# Patient Record
Sex: FEMALE
[De-identification: names, ages, dates, MRNs, and addresses within clinical notes are randomized per-mention and may not be internally consistent; named-entity substitution may affect disease eponyms.]

---

## 2017-05-10 ENCOUNTER — HOSPITAL ENCOUNTER (EMERGENCY)
Dept: HOSPITAL 5 - ED | Age: 32
Discharge: LEFT BEFORE DISCHARGE | End: 2017-05-10
Payer: MEDICAID

## 2017-05-10 VITALS — DIASTOLIC BLOOD PRESSURE: 90 MMHG | SYSTOLIC BLOOD PRESSURE: 118 MMHG

## 2017-05-10 DIAGNOSIS — Z53.21: ICD-10-CM

## 2017-05-10 DIAGNOSIS — M54.9: ICD-10-CM

## 2017-05-10 DIAGNOSIS — R10.9: Primary | ICD-10-CM

## 2017-05-10 LAB
ALBUMIN SERPL-MCNC: 4.6 G/DL (ref 3.9–5)
ALBUMIN/GLOB SERPL: 1.7 %
ALP SERPL-CCNC: 84 UNITS/L (ref 35–129)
ALT SERPL-CCNC: 30 UNITS/L (ref 7–56)
ANION GAP SERPL CALC-SCNC: 18 MMOL/L
BILIRUB SERPL-MCNC: 0.3 MG/DL (ref 0.1–1.2)
BILIRUB UR QL STRIP: (no result)
BLASTOCYTES % (MANUAL): 0 %
BLOOD UR QL VISUAL: (no result)
BUN SERPL-MCNC: 8 MG/DL (ref 7–17)
BUN/CREAT SERPL: 13.33 %
CALCIUM SERPL-MCNC: 9.4 MG/DL (ref 8.4–10.2)
CHLORIDE SERPL-SCNC: 101.7 MMOL/L (ref 98–107)
CO2 SERPL-SCNC: 23 MMOL/L (ref 22–30)
GLUCOSE SERPL-MCNC: 90 MG/DL (ref 65–100)
HCT VFR BLD CALC: 46.1 % (ref 30.3–42.9)
HGB BLD-MCNC: 15.5 GM/DL (ref 10.1–14.3)
KETONES UR STRIP-MCNC: (no result) MG/DL
LEUKOCYTE ESTERASE UR QL STRIP: (no result)
LIPASE SERPL-CCNC: 27 UNITS/L (ref 13–60)
MCH RBC QN AUTO: 32 PG (ref 28–32)
MCHC RBC AUTO-ENTMCNC: 34 % (ref 30–34)
MCV RBC AUTO: 94 FL (ref 79–97)
MUCOUS THREADS #/AREA URNS HPF: (no result) /HPF
NITRITE UR QL STRIP: (no result)
PH UR STRIP: 5 [PH] (ref 5–7)
PLATELET # BLD: 272 K/MM3 (ref 140–440)
POTASSIUM SERPL-SCNC: 4.3 MMOL/L (ref 3.6–5)
PROT SERPL-MCNC: 7.3 G/DL (ref 6.3–8.2)
PROT UR STRIP-MCNC: (no result) MG/DL
RBC # BLD AUTO: 4.92 M/MM3 (ref 3.65–5.03)
RBC #/AREA URNS HPF: < 1 /HPF (ref 0–6)
SODIUM SERPL-SCNC: 138 MMOL/L (ref 137–145)
TOTAL CELLS COUNTED PERCENT: 9
UROBILINOGEN UR-MCNC: < 2 MG/DL (ref ?–2)
WBC # BLD AUTO: 13.8 K/MM3 (ref 4.5–11)
WBC #/AREA URNS HPF: 5 /HPF (ref 0–6)

## 2017-05-10 PROCEDURE — 36415 COLL VENOUS BLD VENIPUNCTURE: CPT

## 2017-05-10 PROCEDURE — 81001 URINALYSIS AUTO W/SCOPE: CPT

## 2017-05-10 PROCEDURE — 86850 RBC ANTIBODY SCREEN: CPT

## 2017-05-10 PROCEDURE — 85007 BL SMEAR W/DIFF WBC COUNT: CPT

## 2017-05-10 PROCEDURE — 85025 COMPLETE CBC W/AUTO DIFF WBC: CPT

## 2017-05-10 PROCEDURE — 86901 BLOOD TYPING SEROLOGIC RH(D): CPT

## 2017-05-10 PROCEDURE — 84702 CHORIONIC GONADOTROPIN TEST: CPT

## 2017-05-10 PROCEDURE — 83690 ASSAY OF LIPASE: CPT

## 2017-05-10 PROCEDURE — 80053 COMPREHEN METABOLIC PANEL: CPT

## 2017-05-10 PROCEDURE — 86900 BLOOD TYPING SEROLOGIC ABO: CPT

## 2017-09-02 ENCOUNTER — HOSPITAL ENCOUNTER (EMERGENCY)
Dept: HOSPITAL 5 - ED | Age: 32
LOS: 1 days | Discharge: HOME | End: 2017-09-03
Payer: MEDICAID

## 2017-09-02 DIAGNOSIS — F17.200: ICD-10-CM

## 2017-09-02 DIAGNOSIS — K52.9: Primary | ICD-10-CM

## 2017-09-02 DIAGNOSIS — R39.198: ICD-10-CM

## 2017-09-02 LAB
ALBUMIN SERPL-MCNC: 3.6 G/DL (ref 3.9–5)
ALBUMIN/GLOB SERPL: 1.2 %
ALP SERPL-CCNC: 73 UNITS/L (ref 35–129)
ALT SERPL-CCNC: 28 UNITS/L (ref 7–56)
ANION GAP SERPL CALC-SCNC: 17 MMOL/L
BACTERIA #/AREA URNS HPF: (no result) /HPF
BASOPHILS NFR BLD AUTO: 0.4 % (ref 0–1.8)
BILIRUB SERPL-MCNC: 0.2 MG/DL (ref 0.1–1.2)
BILIRUB UR QL STRIP: (no result)
BLOOD UR QL VISUAL: (no result)
BUN SERPL-MCNC: 9 MG/DL (ref 7–17)
BUN/CREAT SERPL: 15 %
CALCIUM SERPL-MCNC: 8.3 MG/DL (ref 8.4–10.2)
CHLORIDE SERPL-SCNC: 103.9 MMOL/L (ref 98–107)
CO2 SERPL-SCNC: 19 MMOL/L (ref 22–30)
EOSINOPHIL NFR BLD AUTO: 1.3 % (ref 0–4.3)
GLUCOSE SERPL-MCNC: 83 MG/DL (ref 65–100)
HCT VFR BLD CALC: 39.9 % (ref 30.3–42.9)
HGB BLD-MCNC: 13.5 GM/DL (ref 10.1–14.3)
KETONES UR STRIP-MCNC: (no result) MG/DL
LEUKOCYTE ESTERASE UR QL STRIP: (no result)
LIPASE SERPL-CCNC: 30 UNITS/L (ref 13–60)
MCH RBC QN AUTO: 32 PG (ref 28–32)
MCHC RBC AUTO-ENTMCNC: 34 % (ref 30–34)
MCV RBC AUTO: 95 FL (ref 79–97)
MUCOUS THREADS #/AREA URNS HPF: (no result) /HPF
NITRITE UR QL STRIP: (no result)
PH UR STRIP: 5 [PH] (ref 5–7)
PLATELET # BLD: 239 K/MM3 (ref 140–440)
POTASSIUM SERPL-SCNC: 4.2 MMOL/L (ref 3.6–5)
PROT SERPL-MCNC: 6.5 G/DL (ref 6.3–8.2)
PROT UR STRIP-MCNC: (no result) MG/DL
RBC # BLD AUTO: 4.21 M/MM3 (ref 3.65–5.03)
RBC #/AREA URNS HPF: < 1 /HPF (ref 0–6)
SODIUM SERPL-SCNC: 136 MMOL/L (ref 137–145)
URINE DRUGS OF ABUSE NOTE: (no result)
UROBILINOGEN UR-MCNC: < 2 MG/DL (ref ?–2)
WBC # BLD AUTO: 10.6 K/MM3 (ref 4.5–11)
WBC #/AREA URNS HPF: < 1 /HPF (ref 0–6)

## 2017-09-02 PROCEDURE — 85025 COMPLETE CBC W/AUTO DIFF WBC: CPT

## 2017-09-02 PROCEDURE — 96375 TX/PRO/DX INJ NEW DRUG ADDON: CPT

## 2017-09-02 PROCEDURE — 74176 CT ABD & PELVIS W/O CONTRAST: CPT

## 2017-09-02 PROCEDURE — 96374 THER/PROPH/DIAG INJ IV PUSH: CPT

## 2017-09-02 PROCEDURE — 83690 ASSAY OF LIPASE: CPT

## 2017-09-02 PROCEDURE — 84703 CHORIONIC GONADOTROPIN ASSAY: CPT

## 2017-09-02 PROCEDURE — 51701 INSERT BLADDER CATHETER: CPT

## 2017-09-02 PROCEDURE — 36415 COLL VENOUS BLD VENIPUNCTURE: CPT

## 2017-09-02 PROCEDURE — 81001 URINALYSIS AUTO W/SCOPE: CPT

## 2017-09-02 PROCEDURE — 99284 EMERGENCY DEPT VISIT MOD MDM: CPT

## 2017-09-02 PROCEDURE — 80053 COMPREHEN METABOLIC PANEL: CPT

## 2017-09-02 PROCEDURE — 80307 DRUG TEST PRSMV CHEM ANLYZR: CPT

## 2017-09-02 PROCEDURE — 96376 TX/PRO/DX INJ SAME DRUG ADON: CPT

## 2017-09-02 NOTE — CAT SCAN REPORT
FINAL REPORT



PROCEDURE:  CT ABDOMEN PELVIS WO CON



TECHNIQUE:  Computerized axial tomography of the abdomen and

pelvis was performed without intravenous contrast. This study is

performed without intravascular contrast material and its

sensitivity for abdominal and pelvic pathology, including

neoplasms, inflammation, abscess, free fluid, thrombosis,

arterial dissection and infarction, is reduced compared with a

contrast enhanced study. 



HISTORY:  left flank pain 



COMPARISON:  No prior studies are available for comparison.



FINDINGS:  

Visualized lower thorax: No significant abnormality.



Liver: Normal size and attenuation.



Spleen: Normal size and attenuation.



Gallbladder and biliary system: Normal.



Pancreas: Normal.



Adrenals: Normal.



Kidneys: Normal.



GI tract: Normal. Appendix is normal.



Lymph nodes and mesentery: Normal.



Vasculature: Normal.



Bladder: Normal.



Reproductive organs: Uterus is retroverted.



Peritoneum: No free fluid.



Musculoskeletal structures: Bilateral spondylolysis is noted.



Other: A small uncomplicated umbilical hernia is noted.



IMPRESSION:  

No acute intra-abdominal or pelvic pathology.

## 2017-09-02 NOTE — EMERGENCY DEPARTMENT REPORT
HPI





- General


Chief Complaint: Abdominal Pain


Time Seen by Provider: 09/02/17 20:59





- HPI


HPI: 





Room 8





Patient is a 32-year-old female presenting with chief complaint abdominal pain.

  The patient states past 5 days she has had pain in the left flank and left 

lower quadrant has been constant and sharp in nature.  Patient states she's had 

difficulty urinating since last night.  The patient is to nausea and vomiting 

but denies fever.  Patient denies vaginal discharge or abnormal vaginal 

bleeding.  The patient currently gives her pain score of 9/10.  The patient 

states it feels similar to previous bouts of renal colic





Location: Left flank


Duration: 5 days


Quality: Sharp


Severity: 9/10


Modifying factors: [see above]


Context: [see above]


Mode of transportation: [not driving]





ED Past Medical Hx





- Past Medical History


Previous Medical History?: Yes


Hx Kidney Stones: Yes


Additional medical history: Left ovarian cyst.  endometreosis.  mvp





- Surgical History


Past Surgical History?: Yes


Additional Surgical History: cyst removed, ablation for Mitral valve prolapse





- Family History


Family history: no significant





- Social History


Smoking Status: Current Every Day Smoker (4 cigarettes daily)


Substance Use Type: None (denies illicit drug use), Prescribed





- Medications


Home Medications: 


 Home Medications











 Medication  Instructions  Recorded  Confirmed  Last Taken  Type


 


Hydrocodone Bit/Acetaminophen 1 each PO Q6H PRN #20 tablet 10/27/13 03/21/14 04/

03/14 Rx





[Lortab  mg]     


 


HYDROcodone/APAP 5-325 [Norco 2 each PO Q6HR PRN #30 tablet 04/04/14  Unknown Rx





5/325 mg]     


 


traMADol [Ultram 50 MG tab] 50 mg PO Q6HR PRN #20 tablet 12/09/14  Unknown Rx


 


Fluticasone [Flonase] 1 spray NS QDAY #1 bottle 03/05/17  Unknown Rx


 


Naproxen [Naprosyn] 500 mg PO BID #30 tablet 03/05/17  Unknown Rx


 


Ondansetron [Zofran Odt] 4 mg PO TID #21 tab.rapdis 03/05/17  Unknown Rx


 


Sulfamethoxazole/Trimethoprim 1 each PO BID #6 tablet 03/05/17  Unknown Rx





[Bactrim DS TAB]     


 


methOCARBAMOL [Robaxin TAB] 500 mg PO Q6H PRN #14 tablet 03/05/17  Unknown Rx


 


Doxycycline [Vibramycin CAP] 100 mg PO BID #20 capsule 03/08/17  Unknown Rx


 


Promethazine [Phenergan TAB] 25 mg PO Q8HR PRN #20 tab 03/08/17  Unknown Rx


 


Diphenoxylate HCl/Atropine 2 each PO QID PRN #20 tablet 09/02/17  Unknown Rx





[Lomotil 2.5-0.025 mg Tablet]     


 


Famotidine [Pepcid] 20 mg PO BID #20 tablet 09/02/17  Unknown Rx


 


Promethazine [Phenergan TAB] 25 mg PO Q6HR PRN #20 tab 09/02/17  Unknown Rx


 


Promethazine [Phenergan] 25 mg [place] Q6HR PRN #10 supp.rect 09/02/17  Unknown Rx


 


traMADol [Ultram] 50 mg PO Q6HR PRN #10 tablet 09/02/17  Unknown Rx














ED Review of Systems


ROS: 


Stated complaint: ABD PAIN


Other details as noted in HPI





Comment: All other systems reviewed and negative


Eyes: denies: eye pain, eye discharge, vision change


ENT: denies: ear pain, throat pain


Respiratory: denies: cough, shortness of breath, wheezing


Cardiovascular: denies: chest pain, palpitations


Endocrine: no symptoms reported


Gastrointestinal: abdominal pain, nausea, vomiting, diarrhea


Genitourinary: denies: urgency, dysuria, discharge


Musculoskeletal: back pain.  denies: joint swelling, arthralgia


Skin: denies: rash, lesions


Neurological: denies: headache, weakness, paresthesias


Psychiatric: denies: anxiety, depression


Hematological/Lymphatic: denies: easy bleeding, easy bruising





Physical Exam





- Physical Exam


Vital Signs: 


 Vital Signs











  09/02/17





  20:35


 


Temperature 98.1 F


 


Pulse Rate 97 H


 


Respiratory 20





Rate 


 


Blood Pressure 140/101


 


O2 Sat by Pulse 100





Oximetry 











Physical Exam: 





GENERAL: The patient is well-developed well-nourished female lying on stretcher 

not appearing to be in acute distress. []


HEENT: Normocephalic.  Atraumatic.  Extraocular motions are intact.  Patient 

has moist mucous membranes.


NECK: Supple.  Trachea midline


CHEST/LUNGS: Clear to auscultation.  There is no respiratory distress noted.


HEART/CARDIOVASCULAR: Regular.  There is no tachycardia.  There is no gallop 

rub or murmur.


ABDOMEN: Abdomen is soft, with tenderness to palpation in the left upper 

quadrant, left lower quadrant and suprapubic region.  Patient has normal bowel 

sounds.  There is no abdominal distention.


SKIN: There is no rash.  There is no edema.  There is no diaphoresis.


NEURO: The patient is awake, alert, and oriented.  The patient is cooperative. 

The patient has normal speech 


MUSCULOSKELETAL: There is left CVA tenderness.  There is no evidence of acute 

injury.





ED Course


 Vital Signs











  09/02/17





  20:35


 


Temperature 98.1 F


 


Pulse Rate 97 H


 


Respiratory 20





Rate 


 


Blood Pressure 140/101


 


O2 Sat by Pulse 100





Oximetry 














ED Medical Decision Making





- Lab Data


Result diagrams: 


 09/02/17 20:50





 09/02/17 20:50





 Laboratory Tests











  09/02/17 09/02/17 09/02/17





  20:39 20:50 20:50


 


WBC   10.6 


 


RBC   4.21 


 


Hgb   13.5 


 


Hct   39.9 


 


MCV   95 


 


MCH   32 


 


MCHC   34 


 


RDW   12.7 L 


 


Plt Count   239 


 


Lymph % (Auto)   36.8 H 


 


Mono % (Auto)   8.1 H 


 


Eos % (Auto)   1.3 


 


Baso % (Auto)   0.4 


 


Lymph #   3.9 


 


Mono #   0.9 H 


 


Eos #   0.1 


 


Baso #   0.0 


 


Seg Neutrophils %   53.4 


 


Seg Neutrophils #   5.7 


 


Sodium    136 L


 


Potassium    4.2


 


Chloride    103.9


 


Carbon Dioxide    19 L


 


Anion Gap    17


 


BUN    9


 


Creatinine    0.6 L


 


Estimated GFR    > 60


 


BUN/Creatinine Ratio    15.00


 


Glucose    83


 


Calcium    8.3 L


 


Total Bilirubin    0.20


 


AST    20


 


ALT    28


 


Alkaline Phosphatase    73


 


Total Protein    6.5


 


Albumin    3.6 L


 


Albumin/Globulin Ratio    1.2


 


Lipase    30


 


HCG, Qual   


 


Urine Color  Yellow  


 


Urine Turbidity  Clear  


 


Urine pH  5.0  


 


Ur Specific Gravity  1.025  


 


Urine Protein  <15 mg/dl  


 


Urine Glucose (UA)  Neg  


 


Urine Ketones  Neg  


 


Urine Blood  Neg  


 


Urine Nitrite  Neg  


 


Urine Bilirubin  Neg  


 


Urine Urobilinogen  < 2.0  


 


Ur Leukocyte Esterase  Neg  


 


Urine WBC (Auto)  < 1.0  


 


Urine RBC (Auto)  < 1.0  


 


Urine Bacteria (Auto)  1+  


 


Urine Mucus  Few  


 


Urine Opiates Screen   


 


Urine Methadone Screen   


 


Ur Barbiturates Screen   


 


Ur Phencyclidine Scrn   


 


Ur Amphetamines Screen   


 


U Benzodiazepines Scrn   


 


Urine Cocaine Screen   


 


U Marijuana (THC) Screen   


 


Drugs of Abuse Note   














  09/02/17 09/02/17





  20:50 21:08


 


WBC  


 


RBC  


 


Hgb  


 


Hct  


 


MCV  


 


MCH  


 


MCHC  


 


RDW  


 


Plt Count  


 


Lymph % (Auto)  


 


Mono % (Auto)  


 


Eos % (Auto)  


 


Baso % (Auto)  


 


Lymph #  


 


Mono #  


 


Eos #  


 


Baso #  


 


Seg Neutrophils %  


 


Seg Neutrophils #  


 


Sodium  


 


Potassium  


 


Chloride  


 


Carbon Dioxide  


 


Anion Gap  


 


BUN  


 


Creatinine  


 


Estimated GFR  


 


BUN/Creatinine Ratio  


 


Glucose  


 


Calcium  


 


Total Bilirubin  


 


AST  


 


ALT  


 


Alkaline Phosphatase  


 


Total Protein  


 


Albumin  


 


Albumin/Globulin Ratio  


 


Lipase  


 


HCG, Qual  Negative 


 


Urine Color  


 


Urine Turbidity  


 


Urine pH  


 


Ur Specific Gravity  


 


Urine Protein  


 


Urine Glucose (UA)  


 


Urine Ketones  


 


Urine Blood  


 


Urine Nitrite  


 


Urine Bilirubin  


 


Urine Urobilinogen  


 


Ur Leukocyte Esterase  


 


Urine WBC (Auto)  


 


Urine RBC (Auto)  


 


Urine Bacteria (Auto)  


 


Urine Mucus  


 


Urine Opiates Screen   Presumptive positive


 


Urine Methadone Screen   Presumptive negative


 


Ur Barbiturates Screen   Presumptive negative


 


Ur Phencyclidine Scrn   Presumptive negative


 


Ur Amphetamines Screen   Presumptive negative


 


U Benzodiazepines Scrn   Presumptive positive


 


Urine Cocaine Screen   Presumptive negative


 


U Marijuana (THC) Screen   Presumptive negative


 


Drugs of Abuse Note   Disclamer














- Radiology Data


Radiology results: report reviewed (CT abdomen and pelvis), image reviewed (CT 

abdomen and pelvis)





CT abdomen and pelvis (read by radiologist)-no acute intra-abdominal or pelvic 

pathology





- Differential Diagnosis


renal colic, pyelonephritis, UTI, diverticulitis


Critical care attestation.: 


If time is entered above; I have spent that time in minutes in the direct care 

of this critically ill patient, excluding procedure time.








ED Disposition


Clinical Impression: 


 Acute gastroenteritis, Abdominal pain





Disposition: DC-01 TO HOME OR SELFCARE


Is pt being admited?: No


Does the pt Need Aspirin: No


Condition: Stable


Instructions:  Abdominal Pain (ED)


Additional Instructions: 


Return to the emergency department immediately should you develop worsening 

symptoms, fever, inability to tolerate food or liquid or any other concerns.


Prescriptions: 


Diphenoxylate HCl/Atropine [Lomotil 2.5-0.025 mg Tablet] 2 each PO QID PRN #20 

tablet


 PRN Reason: Diarrhea


Famotidine [Pepcid] 20 mg PO BID #20 tablet


Promethazine [Phenergan TAB] 25 mg PO Q6HR PRN #20 tab


 PRN Reason: Nausea


Promethazine [Phenergan] 25 mg [place] Q6HR PRN #10 supp.rect


 PRN Reason: Vomiting


traMADol [Ultram] 50 mg PO Q6HR PRN #10 tablet


 PRN Reason: Pain


Referrals: 


PRIMARY CARE,MD [Primary Care Provider] - 3-5 Days


WILLIAM ARREGUIN MD [Staff Physician] - 3-5 Days (Dr. Arreguin is a 

gastroenterologist.  Please follow up with him for further evaluation)


Time of Disposition: 23:36

## 2017-09-03 VITALS — SYSTOLIC BLOOD PRESSURE: 120 MMHG | DIASTOLIC BLOOD PRESSURE: 70 MMHG

## 2019-10-10 ENCOUNTER — HOSPITAL ENCOUNTER (EMERGENCY)
Dept: HOSPITAL 5 - ED | Age: 34
Discharge: HOME | End: 2019-10-10
Payer: MEDICAID

## 2019-10-10 DIAGNOSIS — R35.0: ICD-10-CM

## 2019-10-10 DIAGNOSIS — R10.30: ICD-10-CM

## 2019-10-10 DIAGNOSIS — F17.200: ICD-10-CM

## 2019-10-10 DIAGNOSIS — M54.5: Primary | ICD-10-CM

## 2019-10-10 LAB
BILIRUB UR QL STRIP: (no result)
BLOOD UR QL VISUAL: (no result)
PH UR STRIP: 7 [PH] (ref 5–7)
PROT UR STRIP-MCNC: (no result) MG/DL
RBC #/AREA URNS HPF: 3 /HPF (ref 0–6)
UROBILINOGEN UR-MCNC: < 2 MG/DL (ref ?–2)
WBC #/AREA URNS HPF: < 1 /HPF (ref 0–6)

## 2019-10-10 PROCEDURE — 81001 URINALYSIS AUTO W/SCOPE: CPT

## 2019-10-10 PROCEDURE — 81025 URINE PREGNANCY TEST: CPT

## 2019-10-10 PROCEDURE — 99283 EMERGENCY DEPT VISIT LOW MDM: CPT

## 2019-10-10 PROCEDURE — 96372 THER/PROPH/DIAG INJ SC/IM: CPT

## 2019-10-10 NOTE — EMERGENCY DEPARTMENT REPORT
ED Female  HPI





- General


Chief complaint: Abdominal Pain


Stated complaint: ABD/BACK PAIN


Time Seen by Provider: 10/10/19 17:13


Source: patient


Mode of arrival: Ambulatory


Limitations: No Limitations





- History of Present Illness


Initial comments: 





Patient is a 34-year-old female presents emergency room with complaints of lower

back and suprapubic abdominal discomfort that began a week ago.  She has 

associated urinary frequency and pressure upon urinating.  Denies any vomiting, 

diarrhea, chills, fever, vaginal discharge.  States that she is on Depo-Provera 

as birth control.  denies any allergies to medications.





- Related Data


                                  Previous Rx's











 Medication  Instructions  Recorded  Last Taken  Type


 


Hydrocodone Bit/Acetaminophen 1 each PO Q6H PRN #20 tablet 10/27/13 04/03/14 Rx





[Lortab  mg]    


 


HYDROcodone/APAP 5-325 [Norco 2 each PO Q6HR PRN #30 tablet 04/04/14 Unknown Rx





5/325 mg]    


 


traMADol [Ultram 50 MG tab] 50 mg PO Q6HR PRN #20 tablet 12/09/14 Unknown Rx


 


Fluticasone [Flonase] 1 spray NS QDAY #1 bottle 03/05/17 Unknown Rx


 


Naproxen [Naprosyn] 500 mg PO BID #30 tablet 03/05/17 Unknown Rx


 


Ondansetron [Zofran Odt] 4 mg PO TID #21 tab.rapdis 03/05/17 Unknown Rx


 


Sulfamethoxazole/Trimethoprim 1 each PO BID #6 tablet 03/05/17 Unknown Rx





[Bactrim DS TAB]    


 


methOCARBAMOL [Robaxin TAB] 500 mg PO Q6H PRN #14 tablet 03/05/17 Unknown Rx


 


DOXYCYCLINE Hyclate [Vibramycin 100 mg PO BID #20 capsule 03/08/17 Unknown Rx





CAP]    


 


Promethazine [Phenergan TAB] 25 mg PO Q8HR PRN #20 tab 03/08/17 Unknown Rx


 


Diphenoxylate HCl/Atropine 2 each PO QID PRN #20 tablet 09/02/17 Unknown Rx





[Lomotil 2.5-0.025 mg Tablet]    


 


Famotidine [Pepcid] 20 mg PO BID #20 tablet 09/02/17 Unknown Rx


 


Promethazine [Phenergan TAB] 25 mg PO Q6HR PRN #20 tab 09/02/17 Unknown Rx


 


Promethazine [Phenergan] 25 mg [place] Q6HR PRN #10 supp.rect 09/02/17 Unknown Rx


 


traMADol [Ultram] 50 mg PO Q6HR PRN #10 tablet 09/02/17 Unknown Rx


 


Cyclobenzaprine [Flexeril] 10 mg PO QHS PRN #10 tablet 10/10/19 Unknown Rx


 


Naproxen [EC-Naproxen] 500 mg PO BID PRN #14 tablet. 10/10/19 Unknown Rx











                                    Allergies











Allergy/AdvReac Type Severity Reaction Status Date / Time


 


No Known Allergies Allergy   Verified 12/21/13 17:39














ED Review of Systems


ROS: 


Stated complaint: ABD/BACK PAIN


Other details as noted in HPI





Comment: All other systems reviewed and negative





ED Past Medical Hx





- Past Medical History


Previous Medical History?: Yes


Hx Kidney Stones: Yes


Additional medical history: Left ovarian cyst.  endometreosis.  mvp





- Surgical History


Past Surgical History?: Yes


Additional Surgical History: cyst removed, ablation for Mitral valve prolapse





- Social History


Smoking Status: Current Every Day Smoker


Substance Use Type: None





- Medications


Home Medications: 


                                Home Medications











 Medication  Instructions  Recorded  Confirmed  Last Taken  Type


 


Hydrocodone Bit/Acetaminophen 1 each PO Q6H PRN #20 tablet 10/27/13 03/21/14 

04/03/14 Rx





[Lortab  mg]     


 


HYDROcodone/APAP 5-325 [Norco 2 each PO Q6HR PRN #30 tablet 04/04/14  Unknown Rx





5/325 mg]     


 


traMADol [Ultram 50 MG tab] 50 mg PO Q6HR PRN #20 tablet 12/09/14  Unknown Rx


 


Fluticasone [Flonase] 1 spray NS QDAY #1 bottle 03/05/17  Unknown Rx


 


Naproxen [Naprosyn] 500 mg PO BID #30 tablet 03/05/17  Unknown Rx


 


Ondansetron [Zofran Odt] 4 mg PO TID #21 tab.rapdis 03/05/17  Unknown Rx


 


Sulfamethoxazole/Trimethoprim 1 each PO BID #6 tablet 03/05/17  Unknown Rx





[Bactrim DS TAB]     


 


methOCARBAMOL [Robaxin TAB] 500 mg PO Q6H PRN #14 tablet 03/05/17  Unknown Rx


 


DOXYCYCLINE Hyclate [Vibramycin 100 mg PO BID #20 capsule 03/08/17  Unknown Rx





CAP]     


 


Promethazine [Phenergan TAB] 25 mg PO Q8HR PRN #20 tab 03/08/17  Unknown Rx


 


Diphenoxylate HCl/Atropine 2 each PO QID PRN #20 tablet 09/02/17  Unknown Rx





[Lomotil 2.5-0.025 mg Tablet]     


 


Famotidine [Pepcid] 20 mg PO BID #20 tablet 09/02/17  Unknown Rx


 


Promethazine [Phenergan TAB] 25 mg PO Q6HR PRN #20 tab 09/02/17  Unknown Rx


 


Promethazine [Phenergan] 25 mg [place] Q6HR PRN #10 supp.rect 09/02/17  Unknown Rx


 


traMADol [Ultram] 50 mg PO Q6HR PRN #10 tablet 09/02/17  Unknown Rx


 


Cyclobenzaprine [Flexeril] 10 mg PO QHS PRN #10 tablet 10/10/19  Unknown Rx


 


Naproxen [EC-Naproxen] 500 mg PO BID PRN #14 tablet.dr 10/10/19  Unknown Rx














ED Physical Exam





- General


Limitations: No Limitations


General appearance: alert, in no apparent distress





- Head


Head exam: Present: atraumatic, normocephalic





- Eye


Eye exam: Present: normal appearance





- ENT


ENT exam: Present: mucous membranes moist





- Respiratory


Respiratory exam: Present: normal lung sounds bilaterally.  Absent: respiratory 

distress, wheezes, rales, rhonchi, stridor, chest wall tenderness, accessory 

muscle use, decreased breath sounds, prolonged expiratory





- Cardiovascular


Cardiovascular Exam: Present: regular rate, normal rhythm, normal heart sounds. 

Absent: systolic murmur, diastolic murmur, rubs, gallop





- GI/Abdominal


GI/Abdominal exam: Present: soft, normal bowel sounds.  Absent: distended, 

tenderness, guarding, rebound, rigid





- 


External exam: Present: other (pt deferred pelvic examination)





- Back Exam


Back exam: Absent: CVA tenderness (R), CVA tenderness (L)





- Neurological Exam


Neurological exam: Present: alert, oriented X3





- Psychiatric


Psychiatric exam: Present: normal affect, normal mood





- Skin


Skin exam: Present: warm, dry, intact





ED Course


                                   Vital Signs











  10/10/19 10/10/19 10/11/19





  17:13 19:25 02:33


 


Temperature 98.2 F  


 


Pulse Rate 124 H  96 H


 


Respiratory 18 16 16





Rate   


 


Blood Pressure 144/96  


 


Blood Pressure   128/88





[Right]   


 


O2 Sat by Pulse 94  100





Oximetry   














ED Medical Decision Making





- Lab Data








                                   Lab Results











  10/10/19 Range/Units





  Unknown 


 


Urine Color  Yellow  (Yellow)  


 


Urine Turbidity  Slightly-cloudy  (Clear)  


 


Urine pH  7.0  (5.0-7.0)  


 


Ur Specific Gravity  1.016  (1.003-1.030)  


 


Urine Protein  <15 mg/dl  (Negative)  mg/dL


 


Urine Glucose (UA)  Neg  (Negative)  mg/dL


 


Urine Ketones  Neg  (Negative)  mg/dL


 


Urine Blood  Neg  (Negative)  


 


Urine Nitrite  Neg  (Negative)  


 


Urine Bilirubin  Neg  (Negative)  


 


Urine Urobilinogen  < 2.0  (<2.0)  mg/dL


 


Ur Leukocyte Esterase  Neg  (Negative)  


 


Urine WBC (Auto)  < 1.0  (0.0-6.0)  /HPF


 


Urine RBC (Auto)  3.0  (0.0-6.0)  /HPF


 


U Epithel Cells (Auto)  11.0  (0-13.0)  /HPF


 


Amorphous Crystals  Few  


 


Urine HCG, Qual  Negative  (Negative)  














- Medical Decision Making





Patient is a 34-year-old female presents emergency room with complaints of lower

 back and suprapubic abdominal discomfort that began a week ago.  She has 

associated urinary frequency and pressure upon urinating.  Denies any vomiting, 

diarrhea, chills, fever, vaginal discharge.  States that she is on Depo-Provera 

as birth control.  denies any allergies to medications. initial vitals in triage

 with tachycardia which improved upon repeat. no abd tenderness or CVAT on exam,

 no peritoneal signs, bowel sounds are normal.  UA is normal, urine preg is 

negative. Discussed with the patient that we would need to do a pelvic 

examination due to her symptoms and patient declined she states that she would 

like to see her OB/GYN and said to have this performed. she states that she does

 have an OB/GYN which she follows up with regularly and she can make an 

appointment with them. pt given anti-inflammatory and muscle relaxer. advised pt

 to please medication as prescribed as needed.  Do not drive or operate 

machinery while taking muscle relaxer.  please follow-up with your OB/GYN and a 

primary care doctor in the next 2-3 days for further examination.  Return to the

 emergency room for any new or worsening symptoms.


Critical care attestation.: 


If time is entered above; I have spent that time in minutes in the direct care 

of this critically ill patient, excluding procedure time.








ED Disposition


Clinical Impression: 


 Suprapubic abdominal pain, Urinary frequency





Lower back pain


Qualifiers:


 Chronicity: acute Back pain laterality: bilateral Sciatica presence: without 

sciatica Qualified Code(s): M54.5 - Low back pain





Disposition: DC-01 TO HOME OR SELFCARE


Is pt being admited?: No


Does the pt Need Aspirin: No


Condition: Stable


Instructions:  Acute Low Back Pain (ED), Abdominal Pain (ED)


Additional Instructions: 


please medication as prescribed as needed.  Do not drive or operate machinery 

while taking muscle relaxer.  please follow-up with your OB/GYN and a primary 

care doctor in the next 2-3 days for further examination.  Return to the 

emergency room for any new or worsening symptoms.


Prescriptions: 


Cyclobenzaprine [Flexeril] 10 mg PO QHS PRN #10 tablet


 PRN Reason: Muscle Spasm


Naproxen [EC-Naproxen] 500 mg PO BID PRN #14 tablet.


 PRN Reason: pain


Referrals: 


your, primary care doctor [Other] - 2-3 Days


your, OB/GYN [Other] - 2-3 Days


Time of Disposition: 18:57


Print Language: ENGLISH

## 2019-10-10 NOTE — EVENT NOTE
ED Screening Note


Date of service: 10/10/19


Time: 17:13


ED Screening Note: 





35 y/o female comes in for low back pain and abdominal with urnary frequency and

urgency and dysuria times 1 week. Pain 8/10. LMP Depo 











 


This initial assessment/diagnostic orders/clinical plan/treatment(s) is/are sub

ject to change based on patients health status, clinical progression and re-

assessment by fellow clinical providers in the ED. Further treatment and workup 

at subsequent clinical providers discretion. Patient/guardian urged not to elope

from the ED as their condition may be serious if not clinically assessed and 

managed. 





Initial orders include:

## 2019-10-11 VITALS — DIASTOLIC BLOOD PRESSURE: 88 MMHG | SYSTOLIC BLOOD PRESSURE: 128 MMHG

## 2020-08-23 ENCOUNTER — HOSPITAL ENCOUNTER (EMERGENCY)
Dept: HOSPITAL 5 - ED | Age: 35
Discharge: HOME | End: 2020-08-23
Payer: MEDICAID

## 2020-08-23 VITALS — SYSTOLIC BLOOD PRESSURE: 112 MMHG | DIASTOLIC BLOOD PRESSURE: 68 MMHG

## 2020-08-23 DIAGNOSIS — Z79.899: ICD-10-CM

## 2020-08-23 DIAGNOSIS — R10.2: Primary | ICD-10-CM

## 2020-08-23 DIAGNOSIS — F17.200: ICD-10-CM

## 2020-08-23 DIAGNOSIS — Z98.890: ICD-10-CM

## 2020-08-23 DIAGNOSIS — R35.0: ICD-10-CM

## 2020-08-23 DIAGNOSIS — Z87.442: ICD-10-CM

## 2020-08-23 LAB
BACTERIA #/AREA URNS HPF: (no result) /HPF
BILIRUB UR QL STRIP: (no result)
BLOOD UR QL VISUAL: (no result)
BUN SERPL-MCNC: 8 MG/DL (ref 7–17)
BUN/CREAT SERPL: 20 %
CALCIUM SERPL-MCNC: 8.9 MG/DL (ref 8.4–10.2)
HCT VFR BLD CALC: 42.6 % (ref 30.3–42.9)
HEMOLYSIS INDEX: 15
HGB BLD-MCNC: 14.6 GM/DL (ref 10.1–14.3)
MCHC RBC AUTO-ENTMCNC: 34 % (ref 30–34)
MCV RBC AUTO: 97 FL (ref 79–97)
MUCOUS THREADS #/AREA URNS HPF: (no result) /HPF
PH UR STRIP: 6 [PH] (ref 5–7)
PLATELET # BLD: 231 K/MM3 (ref 140–440)
RBC # BLD AUTO: 4.38 M/MM3 (ref 3.65–5.03)
RBC #/AREA URNS HPF: 6 /HPF (ref 0–6)
UROBILINOGEN UR-MCNC: < 2 MG/DL (ref ?–2)
WBC #/AREA URNS HPF: 2 /HPF (ref 0–6)

## 2020-08-23 PROCEDURE — 74177 CT ABD & PELVIS W/CONTRAST: CPT

## 2020-08-23 PROCEDURE — 96374 THER/PROPH/DIAG INJ IV PUSH: CPT

## 2020-08-23 PROCEDURE — 83690 ASSAY OF LIPASE: CPT

## 2020-08-23 PROCEDURE — 81025 URINE PREGNANCY TEST: CPT

## 2020-08-23 PROCEDURE — 84703 CHORIONIC GONADOTROPIN ASSAY: CPT

## 2020-08-23 PROCEDURE — 96375 TX/PRO/DX INJ NEW DRUG ADDON: CPT

## 2020-08-23 PROCEDURE — 80048 BASIC METABOLIC PNL TOTAL CA: CPT

## 2020-08-23 PROCEDURE — 96361 HYDRATE IV INFUSION ADD-ON: CPT

## 2020-08-23 PROCEDURE — 99284 EMERGENCY DEPT VISIT MOD MDM: CPT

## 2020-08-23 PROCEDURE — 81001 URINALYSIS AUTO W/SCOPE: CPT

## 2020-08-23 PROCEDURE — 85025 COMPLETE CBC W/AUTO DIFF WBC: CPT

## 2020-08-23 PROCEDURE — 36415 COLL VENOUS BLD VENIPUNCTURE: CPT

## 2020-08-23 NOTE — EMERGENCY DEPARTMENT REPORT
ED Abdominal Pain HPI





- General


Chief Complaint: Abdominal Pain


Stated Complaint: BACK AND SIDE PAIN


Time Seen by Provider: 08/23/20 13:15


Source: patient


Mode of arrival: Ambulatory


Limitations: No Limitations





- History of Present Illness


Initial Comments: 





Patient is 35 years old female with past medical history of borderline diabetes.

 Patient presented to the ER complaining of left flank pain.  Patient stated the

symptoms started 3 weeks ago on and off but for the last 2 to 3 days became more

worse.  Patient also complaining of increased urinary frequency.  Patient denied

any nausea or vomiting.  No vaginal bleeding or vaginal discharge.  Patient 

denied any chest pain or shortness of breath.


MD Complaint: abdominal pain


-: days(s)


Location: L flank


Radiation: suprapubic


Migration to: no migration


Severity scale (0 -10): 10


Quality: sharp





- Related Data


                                  Previous Rx's











 Medication  Instructions  Recorded  Last Taken  Type


 


Hydrocodone Bit/Acetaminophen 1 each PO Q6H PRN #20 tablet 10/27/13 04/03/14 Rx





[Lortab  mg]    


 


HYDROcodone/APAP 5-325 [Norco 2 each PO Q6HR PRN #30 tablet 04/04/14 Unknown Rx





5/325 mg]    


 


traMADoL [Ultram 50 MG tab] 50 mg PO Q6HR PRN #20 tablet 12/09/14 Unknown Rx


 


Fluticasone [Flonase] 1 spray NS QDAY #1 bottle 03/05/17 Unknown Rx


 


Naproxen [Naprosyn] 500 mg PO BID #30 tablet 03/05/17 Unknown Rx


 


Ondansetron [Zofran Odt] 4 mg PO TID #21 tab.rapdis 03/05/17 Unknown Rx


 


Sulfamethoxazole/Trimethoprim 1 each PO BID #6 tablet 03/05/17 Unknown Rx





[Bactrim DS TAB]    


 


methOCARBAMOL [Robaxin TAB] 500 mg PO Q6H PRN #14 tablet 03/05/17 Unknown Rx


 


DOXYCYCLINE Hyclate [Vibramycin 100 mg PO BID #20 capsule 03/08/17 Unknown Rx





CAP]    


 


Promethazine [Phenergan TAB] 25 mg PO Q8HR PRN #20 tab 03/08/17 Unknown Rx


 


Diphenoxylate HCl/Atropine 2 each PO QID PRN #20 tablet 09/02/17 Unknown Rx





[Lomotil 2.5-0.025 mg Tablet]    


 


Famotidine [Pepcid] 20 mg PO BID #20 tablet 09/02/17 Unknown Rx


 


Promethazine [Phenergan TAB] 25 mg PO Q6HR PRN #20 tab 09/02/17 Unknown Rx


 


Promethazine [Phenergan] 25 mg [place] Q6HR PRN #10 supp.rect 09/02/17 Unknown Rx


 


traMADoL [Ultram] 50 mg PO Q6HR PRN #10 tablet 09/02/17 Unknown Rx


 


Cyclobenzaprine [Flexeril] 10 mg PO QHS PRN #10 tablet 10/10/19 Unknown Rx


 


Naproxen [EC-Naproxen] 500 mg PO BID PRN #14 tablet.dr 10/10/19 Unknown Rx











                                    Allergies











Allergy/AdvReac Type Severity Reaction Status Date / Time


 


No Known Allergies Allergy   Verified 12/21/13 17:39














ED Review of Systems


ROS: 


Stated complaint: BACK AND SIDE PAIN


Other details as noted in HPI





Comment: All other systems reviewed and negative


Constitutional: denies: chills, fever


Respiratory: denies: cough, shortness of breath, SOB with exertion, SOB at rest,

wheezing


Cardiovascular: denies: chest pain


Gastrointestinal: abdominal pain.  denies: nausea, vomiting, diarrhea, constip

ation, hematemesis, melena, hematochezia


Musculoskeletal: denies: back pain


Neurological: denies: headache, weakness, numbness, paresthesias, confusion





ED Past Medical Hx





- Past Medical History


Previous Medical History?: Yes


Hx Kidney Stones: Yes


Additional medical history: Left ovarian cyst.  endometreosis.  mvp





- Surgical History


Past Surgical History?: Yes


Additional Surgical History: cyst removed, ablation for Mitral valve prolapse





- Social History


Smoking Status: Current Every Day Smoker


Substance Use Type: None





- Medications


Home Medications: 


                                Home Medications











 Medication  Instructions  Recorded  Confirmed  Last Taken  Type


 


Hydrocodone Bit/Acetaminophen 1 each PO Q6H PRN #20 tablet 10/27/13 03/21/14 

04/03/14 Rx





[Lortab  mg]     


 


HYDROcodone/APAP 5-325 [Norco 2 each PO Q6HR PRN #30 tablet 04/04/14  Unknown Rx





5/325 mg]     


 


traMADoL [Ultram 50 MG tab] 50 mg PO Q6HR PRN #20 tablet 12/09/14  Unknown Rx


 


Fluticasone [Flonase] 1 spray NS QDAY #1 bottle 03/05/17  Unknown Rx


 


Naproxen [Naprosyn] 500 mg PO BID #30 tablet 03/05/17  Unknown Rx


 


Ondansetron [Zofran Odt] 4 mg PO TID #21 tab.rapdis 03/05/17  Unknown Rx


 


Sulfamethoxazole/Trimethoprim 1 each PO BID #6 tablet 03/05/17  Unknown Rx





[Bactrim DS TAB]     


 


methOCARBAMOL [Robaxin TAB] 500 mg PO Q6H PRN #14 tablet 03/05/17  Unknown Rx


 


DOXYCYCLINE Hyclate [Vibramycin 100 mg PO BID #20 capsule 03/08/17  Unknown Rx





CAP]     


 


Promethazine [Phenergan TAB] 25 mg PO Q8HR PRN #20 tab 03/08/17  Unknown Rx


 


Diphenoxylate HCl/Atropine 2 each PO QID PRN #20 tablet 09/02/17  Unknown Rx





[Lomotil 2.5-0.025 mg Tablet]     


 


Famotidine [Pepcid] 20 mg PO BID #20 tablet 09/02/17  Unknown Rx


 


Promethazine [Phenergan TAB] 25 mg PO Q6HR PRN #20 tab 09/02/17  Unknown Rx


 


Promethazine [Phenergan] 25 mg [place] Q6HR PRN #10 supp.rect 09/02/17  Unknown Rx


 


traMADoL [Ultram] 50 mg PO Q6HR PRN #10 tablet 09/02/17  Unknown Rx


 


Cyclobenzaprine [Flexeril] 10 mg PO QHS PRN #10 tablet 10/10/19  Unknown Rx


 


Naproxen [EC-Naproxen] 500 mg PO BID PRN #14 tablet.dr 10/10/19  Unknown Rx














ED Physical Exam





- General


Limitations: No Limitations


General appearance: alert, in distress (Moderate secondary to pain.)





- Head


Head exam: Present: atraumatic, normocephalic, normal inspection





- ENT


ENT exam: Present: normal exam, normal orophraynx, mucous membranes moist





- Neck


Neck exam: Present: normal inspection, full ROM.  Absent: tenderness, 

meningismus





- Respiratory


Respiratory exam: Present: normal lung sounds bilaterally





- Cardiovascular


Cardiovascular Exam: Present: regular rate, normal rhythm, normal heart sounds





- GI/Abdominal


GI/Abdominal exam: Present: soft, normal bowel sounds.  Absent: distended, 

tenderness, guarding, rebound, rigid, organomegaly, mass, bruit, pulsatile mass,

hernia





- Extremities Exam


Extremities exam: Present: normal inspection, full ROM, normal capillary refill.

 Absent: pedal edema, calf tenderness





- Back Exam


Back exam: Present: normal inspection, full ROM, CVA tenderness (L).  Absent: 

CVA tenderness (R), muscle spasm, paraspinal tenderness, vertebral tenderness





- Neurological Exam


Neurological exam: Present: alert, oriented X3, CN II-XII intact, normal gait





- Psychiatric


Psychiatric exam: Present: normal mood





- Skin


Skin exam: Present: warm, intact, normal color





ED Course


                                   Vital Signs











  08/23/20 08/23/20 08/23/20





  13:31 13:37 13:45


 


Temperature  98.6 F 


 


Pulse Rate  110 H 110 H


 


Respiratory 18 16 19





Rate   


 


Blood Pressure   130/86


 


Blood Pressure  130/86 





[Left]   


 


O2 Sat by Pulse 96 98 98





Oximetry   














  08/23/20





  14:15


 


Temperature 


 


Pulse Rate 100 H


 


Respiratory 10 L





Rate 


 


Blood Pressure 128/86


 


Blood Pressure 





[Left] 


 


O2 Sat by Pulse 97





Oximetry 














ED Medical Decision Making





- Lab Data


Result diagrams: 


                                 08/23/20 13:38





                                 08/23/20 13:38





- Radiology Data


Radiology results: report reviewed





- Medical Decision Making





Patient is 35 years old female with past medical history of borderline diabetes.

  Patient presented to the ER complaining of left flank pain.  Patient stated 

the symptoms started 3 weeks ago on and off but for the last 2 to 3 days became 

more worse.  Patient also complaining of increased urinary frequency.  Patient 

denied any nausea or vomiting.  No vaginal bleeding or vaginal discharge.  

Patient denied any chest pain or shortness of breath.





Patient received morphine and Zofran and normal saline.  Patient stated that she

 is feeling much better.  Labs reviewed and is unremarkable.  CT abdomen and 

pelvis is negative for acute finding.  Patient given prescription for Naprosyn 

and Flexeril and advised to follow-up with her primary care physician in the 

next 2 to 3 days and to return to the ER if she develop any new symptoms.


Critical care attestation.: 


If time is entered above; I have spent that time in minutes in the direct care 

of this critically ill patient, excluding procedure time.








ED Disposition


Clinical Impression: 


 Left flank pain





Disposition: DC-01 TO HOME OR SELFCARE


Is pt being admited?: No


Condition: Stable


Instructions:  Abdominal Pain (ED), Flank Pain (ED)


Referrals: 


[facility] [Provider Group] - 3-5 Days

## 2020-08-23 NOTE — CAT SCAN REPORT
CT ABDOMEN AND PELVIS WITH IV CONTRAST



INDICATION:

Abdominal pain, left flank pain..



COMPARISON:

None available.



TECHNIQUE:

All CT scans at this facility use dose modulation, automated exposure control, iterative reconstructi
on or weight based dosing, when appropriate, to reduce radiation dose to as low as reasonably achieva
ble.



FINDINGS:



Lung Bases: No significant abnormality.



Skeletal System: No acute abnormality.  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 
significant listhesis.



ABDOMEN:

Liver: No significant abnormality.

Gallbladder: No significant abnormality.  

Bile Ducts: No significant abnormality.

Pancreas: No significant abnormality.

Spleen: No significant abnormality.

Adrenals: No significant abnormality.

Right Kidney: No significant abnormality.

Left Kidney: No significant abnormality.

Upper GI tract: No significant abnormality.

Lymph Nodes: No significant adenopathy.

Aorta: No significant abnormality. 

Additional Findings: Tiny fat-containing umbilical hernia unchanged.



PELVIS:

Colon:  No acute abnormality.

Urinary Bladder and Distal Ureters: No significant abnormality.

Appendix: No significant abnormality.  

Lymph Nodes: No significant adenopathy.

Additional Findings: None.





IMPRESSION:

1.  No acute process in the abdomen or pelvis.

2.  Incidental findings, as above.



Signer Name: Justo Steele MD 

Signed: 8/23/2020 3:37 PM

Workstation Name: [id number]

## 2020-09-30 ENCOUNTER — HOSPITAL ENCOUNTER (OUTPATIENT)
Dept: HOSPITAL 5 - VAS | Age: 35
Discharge: HOME | End: 2020-09-30
Attending: FAMILY MEDICINE
Payer: MEDICAID

## 2020-09-30 DIAGNOSIS — M79.89: Primary | ICD-10-CM

## 2020-09-30 PROCEDURE — 93970 EXTREMITY STUDY: CPT

## 2020-09-30 NOTE — VASCULAR LAB REPORT
DUPLEX DOPPLER LOWER EXTREMITY VEINS, BILATERAL



INDICATION:  SWELLING OF LOWER LEG.



TECHNIQUE:  Duplex doppler imaging was performed through the veins of both lower extremities using ve
nous compression and other maneuvers.



COMPARISON:  No relevant prior imaging study available.



FINDINGS:

Right Common femoral vein: Negative.

Right Superficial femoral vein: Negative.

Right Popliteal vein: Negative.

Right Calf veins: Negative.



Left Common femoral vein: Negative.

Left Superficial femoral vein: Negative.

Left Popliteal vein: Negative.

Left Calf veins: Negative.



Additional findings: None.



IMPRESSION:

 No sonographic evidence for DVT in either lower extremity.



Signer Name: Bc Hutson Jr, MD 

Signed: 9/30/2020 3:39 PM

Workstation Name: [id number]

## 2020-12-15 ENCOUNTER — HOSPITAL ENCOUNTER (EMERGENCY)
Dept: HOSPITAL 5 - ED | Age: 35
Discharge: LEFT BEFORE DISCHARGE | End: 2020-12-15
Payer: MEDICAID

## 2020-12-15 DIAGNOSIS — R07.89: Primary | ICD-10-CM

## 2020-12-15 DIAGNOSIS — Z53.21: ICD-10-CM
